# Patient Record
Sex: MALE | Race: WHITE | NOT HISPANIC OR LATINO | URBAN - NONMETROPOLITAN AREA
[De-identification: names, ages, dates, MRNs, and addresses within clinical notes are randomized per-mention and may not be internally consistent; named-entity substitution may affect disease eponyms.]

---

## 2021-01-15 DIAGNOSIS — Z23 NEED FOR VACCINATION: ICD-10-CM

## 2022-07-11 ENCOUNTER — OFFICE VISIT (OUTPATIENT)
Dept: URGENT CARE | Facility: PHYSICIAN GROUP | Age: 72
End: 2022-07-11
Payer: COMMERCIAL

## 2022-07-11 VITALS
BODY MASS INDEX: 26.79 KG/M2 | TEMPERATURE: 98.2 F | HEIGHT: 76 IN | OXYGEN SATURATION: 93 % | WEIGHT: 220 LBS | SYSTOLIC BLOOD PRESSURE: 118 MMHG | RESPIRATION RATE: 16 BRPM | HEART RATE: 64 BPM | DIASTOLIC BLOOD PRESSURE: 72 MMHG

## 2022-07-11 DIAGNOSIS — B34.9 VIREMIA: ICD-10-CM

## 2022-07-11 DIAGNOSIS — I10 PRIMARY HYPERTENSION: ICD-10-CM

## 2022-07-11 DIAGNOSIS — U07.1 COVID: ICD-10-CM

## 2022-07-11 DIAGNOSIS — H35.30 MACULAR DEGENERATION OF BOTH EYES, UNSPECIFIED TYPE: ICD-10-CM

## 2022-07-11 DIAGNOSIS — Z20.822 EXPOSURE TO COVID-19 VIRUS: ICD-10-CM

## 2022-07-11 LAB
EXTERNAL QUALITY CONTROL: ABNORMAL
SARS-COV+SARS-COV-2 AG RESP QL IA.RAPID: POSITIVE

## 2022-07-11 PROCEDURE — 99203 OFFICE O/P NEW LOW 30 MIN: CPT | Mod: CS | Performed by: NURSE PRACTITIONER

## 2022-07-11 PROCEDURE — 87426 SARSCOV CORONAVIRUS AG IA: CPT | Performed by: NURSE PRACTITIONER

## 2022-07-11 ASSESSMENT — ENCOUNTER SYMPTOMS
SHORTNESS OF BREATH: 1
COUGH: 1
CHILLS: 0
MYALGIAS: 1
FEVER: 0
DIAPHORESIS: 0
PALPITATIONS: 0
SINUS PAIN: 0
WHEEZING: 0
SORE THROAT: 0
HEMOPTYSIS: 0
ORTHOPNEA: 0
SPUTUM PRODUCTION: 0
NECK PAIN: 0
DIZZINESS: 0

## 2022-07-11 NOTE — PROGRESS NOTES
"Subjective     Vivek Hermes Valencia is a 71 y.o. male who presents with Diarrhea (Wife was positive on Friday, His diarrhea started on the 6th an 7th it was gone), Fatigue (Started on the 5th), Cough (X 8 days), and Runny Nose            Vivek comes in today with an 8 day history of feeling unwell.  He reports fatigue, sweating, rhinorrhea, mild cough, and mild chest tightness.  He also had diarrhea x 2 days that has since resolved.  He denies any chest pain.  He reports his wife tested positive for covid 3 days ago. At that time, [facility] but was unable to be seen.  He has never had covid in the past.  He is vaccinated against covid with 1 additional booster in February 2022.  Medical history significant for macular degeneration (can't drive as he is legally blind) and hypertension.  He cannot recall the name of his hypertension medication.  Denies any other daily meds or supplements.  He is a 2 ppd smoker with a 100 pack year history.        Review of Systems   Constitutional: Positive for malaise/fatigue. Negative for chills, diaphoresis and fever.   HENT: Positive for congestion. Negative for ear pain, sinus pain and sore throat.    Respiratory: Positive for cough and shortness of breath. Negative for hemoptysis, sputum production and wheezing.    Cardiovascular: Negative for chest pain, palpitations, orthopnea and leg swelling.   Musculoskeletal: Positive for myalgias. Negative for neck pain.   Skin: Negative for rash.   Neurological: Negative for dizziness.     Medications, Allergies, and current problem list reviewed today in Epic         Objective     Blood Pressure 118/72   Pulse 64   Temperature 36.8 °C (98.2 °F) (Temporal)   Respiration 16   Height 1.93 m (6' 4\") Comment: per pt  Weight 99.8 kg (220 lb)   Oxygen Saturation 93%   Body Mass Index 26.78 kg/m²      Physical Exam  Vitals reviewed.   Constitutional:       General: He is not in acute " distress.     Appearance: Normal appearance. He is well-developed. He is not ill-appearing, toxic-appearing or diaphoretic.   HENT:      Right Ear: Tympanic membrane, ear canal and external ear normal. There is no impacted cerumen.      Left Ear: Tympanic membrane, ear canal and external ear normal. There is no impacted cerumen.      Nose: Congestion and rhinorrhea present.      Mouth/Throat:      Mouth: Mucous membranes are moist.      Pharynx: Posterior oropharyngeal erythema present. No oropharyngeal exudate.   Eyes:      General: No scleral icterus.        Right eye: No discharge.         Left eye: No discharge.      Conjunctiva/sclera: Conjunctivae normal.   Neck:      Thyroid: No thyromegaly.      Vascular: No JVD.      Trachea: No tracheal deviation.   Cardiovascular:      Rate and Rhythm: Normal rate and regular rhythm.      Heart sounds: Normal heart sounds. No murmur heard.    No friction rub. No gallop.   Pulmonary:      Effort: Pulmonary effort is normal. No respiratory distress.      Breath sounds: No stridor. Wheezing present. No rhonchi or rales.      Comments: Scattered wheezes in all fields.  Chest:      Chest wall: No tenderness.   Abdominal:      General: There is no distension.      Palpations: Abdomen is soft.      Tenderness: There is no abdominal tenderness.   Musculoskeletal:      Cervical back: Neck supple.   Lymphadenopathy:      Cervical: No cervical adenopathy.   Skin:     General: Skin is warm and dry.      Coloration: Skin is not jaundiced or pale.      Findings: No erythema.   Neurological:      General: No focal deficit present.      Mental Status: He is alert and oriented to person, place, and time.      Motor: No weakness.   Psychiatric:         Mood and Affect: Mood normal.             POCT covid antigen: Positive                Assessment & Plan        1. COVID     2. Viremia  POCT SARS-COV Antigen ALEX (Symptomatic only)   3. Exposure to COVID-19 virus  POCT SARS-COV Antigen ALEX  (Symptomatic only)   4. Macular degeneration of both eyes, unspecified type     5. Primary hypertension         Advised Vivek that based on the history and exam findings, this is Covid, a viral illness.  Differential and secondary complication risks reviewed.  There is no indication for antibiotics at this time.  He is out of treatment range for monoclonal antibodies, paxlovid, or molnupiravir.    OTC NSAIDs or tylenol prn fever, pain.  OTC cold medications prn symptom management.  Maintain adequate po hydration.  Follow up with PCP in 1 week for re-check, or go to ED sooner if worse.  He verbalized understanding of and agreed with plan of care.